# Patient Record
Sex: FEMALE | Race: BLACK OR AFRICAN AMERICAN
[De-identification: names, ages, dates, MRNs, and addresses within clinical notes are randomized per-mention and may not be internally consistent; named-entity substitution may affect disease eponyms.]

---

## 2018-04-01 ENCOUNTER — HOSPITAL ENCOUNTER (OUTPATIENT)
Dept: HOSPITAL 76 - EMS | Age: 33
Discharge: ANOTHER HOSPITAL | End: 2018-04-01
Attending: SURGERY
Payer: MEDICAID

## 2018-04-01 ENCOUNTER — HOSPITAL ENCOUNTER (INPATIENT)
Dept: HOSPITAL 76 - ED | Age: 33
LOS: 2 days | Discharge: HOME | End: 2018-04-03
Attending: OBSTETRICS & GYNECOLOGY | Admitting: OBSTETRICS & GYNECOLOGY
Payer: MEDICAID

## 2018-04-01 DIAGNOSIS — D50.0: ICD-10-CM

## 2018-04-01 DIAGNOSIS — R10.9: ICD-10-CM

## 2018-04-01 DIAGNOSIS — R42: ICD-10-CM

## 2018-04-01 LAB
ALBUMIN DIAFP-MCNC: 2.8 G/DL (ref 3.2–5.5)
ALBUMIN/GLOB SERPL: 0.8 {RATIO} (ref 1–2.2)
ALP SERPL-CCNC: 170 IU/L (ref 42–121)
ALT SERPL W P-5'-P-CCNC: 12 IU/L (ref 10–60)
ANION GAP SERPL CALCULATED.4IONS-SCNC: 9 MMOL/L (ref 6–13)
AST SERPL W P-5'-P-CCNC: 25 IU/L (ref 10–42)
BASOPHILS # BLD MANUAL: 0 10^3/UL (ref 0–0.1)
BASOPHILS # BLD MANUAL: 0 10^3/UL (ref 0–0.1)
BASOPHILS NFR BLD AUTO: 0.1 %
BASOPHILS NFR BLD AUTO: 0.1 %
BILIRUB BLD-MCNC: 0.6 MG/DL (ref 0.2–1)
BUN SERPL-MCNC: 8 MG/DL (ref 6–20)
CALCIUM UR-MCNC: 8.8 MG/DL (ref 8.5–10.3)
CHLORIDE SERPL-SCNC: 103 MMOL/L (ref 101–111)
CO2 SERPL-SCNC: 21 MMOL/L (ref 21–32)
CREAT SERPLBLD-SCNC: 0.7 MG/DL (ref 0.4–1)
EOSINOPHIL # BLD MANUAL: 0 10^3/UL (ref 0–0.7)
EOSINOPHIL # BLD MANUAL: 0 10^3/UL (ref 0–0.7)
EOSINOPHIL NFR BLD AUTO: 0 %
EOSINOPHIL NFR BLD AUTO: 0.1 %
ERYTHROCYTE [DISTWIDTH] IN BLOOD BY AUTOMATED COUNT: 13 % (ref 12–15)
ERYTHROCYTE [DISTWIDTH] IN BLOOD BY AUTOMATED COUNT: 13.1 % (ref 12–15)
FIBRINOGEN PPP-MCNC: 524 MG/DL (ref 220–496)
GFRSERPLBLD MDRD-ARVRAT: 116 ML/MIN/{1.73_M2} (ref 89–?)
GLOBULIN SER-MCNC: 3.4 G/DL (ref 2.1–4.2)
GLUCOSE SERPL-MCNC: 158 MG/DL (ref 70–100)
HGB UR QL STRIP: 10.3 G/DL (ref 12–16)
HGB UR QL STRIP: 7.1 G/DL (ref 12–16)
INR PPP: 1 (ref 0.8–1.2)
LIPASE SERPL-CCNC: 25 U/L (ref 22–51)
LYMPH ABN NFR BLD MANUAL: 0 %
LYMPH ABN NFR BLD MANUAL: 0 %
LYMPHOBLASTS # BLD: 10 %
LYMPHOBLASTS # BLD: 5 %
LYMPHOCYTES # BLD MANUAL: 1.3 10^3/UL (ref 1.5–3.5)
LYMPHOCYTES # BLD MANUAL: 2 10^3/UL (ref 1.5–3.5)
LYMPHOCYTES NFR BLD AUTO: 10 %
LYMPHOCYTES NFR BLD AUTO: 5.5 %
MANUAL DIF COMMENT BLD-IMP: (no result)
MCH RBC QN AUTO: 29.2 PG (ref 27–31)
MCH RBC QN AUTO: 29.5 PG (ref 27–31)
MCHC RBC AUTO-ENTMCNC: 33.5 G/DL (ref 32–36)
MCHC RBC AUTO-ENTMCNC: 33.7 G/DL (ref 32–36)
MCV RBC AUTO: 87 FL (ref 81–99)
MCV RBC AUTO: 87.5 FL (ref 81–99)
MONOCYTES # BLD MANUAL: 1 10^3/UL (ref 0–1)
MONOCYTES # BLD MANUAL: 1.8 10^3/UL (ref 0–1)
MONOCYTES NFR BLD AUTO: 4.4 %
MONOCYTES NFR BLD AUTO: 7.3 %
NEUTROPHILS # SNV AUTO: 20.4 X10^3/UL (ref 4.8–10.8)
NEUTROPHILS # SNV AUTO: 25.5 X10^3/UL (ref 4.8–10.8)
NEUTROPHILS NFR BLD AUTO: 82.5 %
NEUTROPHILS NFR BLD AUTO: 90 %
NEUTROPHILS NFR BLD MANUAL: 17.3 10^3/UL (ref 1.5–6.6)
NEUTROPHILS NFR BLD MANUAL: 22.4 10^3/UL (ref 1.5–6.6)
NEUTS BAND NFR BLD MANUAL: 83 %
NEUTS BAND NFR BLD MANUAL: 88 %
NEUTS BAND NFR BLD: 0 %
NEUTS BAND NFR BLD: 2 %
PDW BLD AUTO: 8.2 FL (ref 7.9–10.8)
PDW BLD AUTO: 8.4 FL (ref 7.9–10.8)
PLAT MORPH BLD: (no result)
PLAT MORPH BLD: (no result)
PLATELET # BLD: 205 10^3/UL (ref 130–450)
PLATELET # BLD: 246 10^3/UL (ref 130–450)
PLATELET BLD QL SMEAR: (no result)
PLATELET BLD QL SMEAR: (no result)
PROT SPEC-MCNC: 6.2 G/DL (ref 6.7–8.2)
PROTHROM ACT/NOR PPP: 11 SECS (ref 9.9–12.6)
RBC MAR: 2.42 10^6/UL (ref 4.2–5.4)
RBC MAR: 3.48 10^6/UL (ref 4.2–5.4)
RBC MORPH BLD: (no result)
RBC MORPH BLD: (no result)
SODIUM SERPLBLD-SCNC: 133 MMOL/L (ref 135–145)

## 2018-04-01 PROCEDURE — 83690 ASSAY OF LIPASE: CPT

## 2018-04-01 PROCEDURE — 99284 EMERGENCY DEPT VISIT MOD MDM: CPT

## 2018-04-01 PROCEDURE — 86920 COMPATIBILITY TEST SPIN: CPT

## 2018-04-01 PROCEDURE — 99285 EMERGENCY DEPT VISIT HI MDM: CPT

## 2018-04-01 PROCEDURE — 85730 THROMBOPLASTIN TIME PARTIAL: CPT

## 2018-04-01 PROCEDURE — 86850 RBC ANTIBODY SCREEN: CPT

## 2018-04-01 PROCEDURE — 36415 COLL VENOUS BLD VENIPUNCTURE: CPT

## 2018-04-01 PROCEDURE — 76857 US EXAM PELVIC LIMITED: CPT

## 2018-04-01 PROCEDURE — 85610 PROTHROMBIN TIME: CPT

## 2018-04-01 PROCEDURE — 80053 COMPREHEN METABOLIC PANEL: CPT

## 2018-04-01 PROCEDURE — 86901 BLOOD TYPING SEROLOGIC RH(D): CPT

## 2018-04-01 PROCEDURE — 85025 COMPLETE CBC W/AUTO DIFF WBC: CPT

## 2018-04-01 PROCEDURE — 86900 BLOOD TYPING SEROLOGIC ABO: CPT

## 2018-04-01 PROCEDURE — 85384 FIBRINOGEN ACTIVITY: CPT

## 2018-04-01 RX ADMIN — HYDROCODONE BITARTRATE AND ACETAMINOPHEN PRN TAB: 5; 325 TABLET ORAL at 17:48

## 2018-04-01 RX ADMIN — IBUPROFEN PRN MG: 600 TABLET, FILM COATED ORAL at 17:48

## 2018-04-01 RX ADMIN — OXYCODONE AND ACETAMINOPHEN PRN TAB: 5; 325 TABLET ORAL at 20:15
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
## 2018-04-01 NOTE — ED PHYSICIAN DOCUMENTATION
History of Present Illness





- Stated complaint


Stated Complaint: POST BIRTH COMPLICATION





- History obtained from


History obtained from: Patient





- History of Present Illness


Timing: Other (32-year-old otherwise healthy  who just gave birth today 

and had a lot of blood loss postpartum with some hypotension in route.  She 

received 10 Units of Pitocin IM.)





Review of Systems


Ten Systems: 10 systems reviewed and negative


Constitutional: reports: Reviewed and negative


Nose: reports: Reviewed and negative


Cardiac: reports: Reviewed and negative


Respiratory: reports: Reviewed and negative





PD PAST MEDICAL HISTORY





- Past Medical History


Past Medical History: No





- Past Surgical History


Past Surgical History: No





- Present Medications


Home Medications: 


 Ambulatory Orders











 Medication  Instructions  Recorded  Confirmed


 


No Known Home Medications [No  18





Known Home Medications]   














- Allergies


Allergies/Adverse Reactions: 


 Allergies











Allergy/AdvReac Type Severity Reaction Status Date / Time


 


No Known Drug Allergies Allergy   Verified 18 14:42














- Social History


Does the pt smoke?: No


Does the pt drink ETOH?: No


Does the pt have substance abuse?: No





- Family History


Family history: reports: Non contributory





PD ED PE NORMAL





- Vitals


Vital signs reviewed: Yes (tachycardic, not hypotensive)





- General


General: Alert and oriented X 3, No acute distress





- HEENT


HEENT: PERRL, EOMI





- Neck


Neck: Supple, no meningeal sign, No bony TTP





- Cardiac


Cardiac: RRR, No murmur, Strong equal pulses





- Respiratory


Respiratory: No respiratory distress, Clear bilaterally





- Abdomen


Abdomen: Non tender, Other (firm fundus)





- Back


Back: No CVA TTP, No spinal TTP





- Extremities


Extremities: No edema, No calf tenderness / cord





- Neuro


Neuro: Alert and oriented X 3, Normal speech





- Psych


Psych: Normal mood, Normal affect





Results





- Vitals


Vitals: 


 Vital Signs - 24 hr











  18





  14:38


 


Heart Rate 112 H


 


Respiratory 20





Rate 


 


Blood Pressure 121/86 H


 


O2 Saturation 100








 Oxygen











O2 Source                      Room air

















- Labs


Labs: 


 Laboratory Tests











  18





  14:35


 


WBC  25.5 H


 


RBC  3.48 L


 


Hgb  10.3 L


 


Hct  30.4 L


 


MCV  87.5


 


MCH  29.5


 


MCHC  33.7


 


RDW  13.0


 


Plt Count  246


 


MPV  8.4














PD MEDICAL DECISION MAKING





- ED course


ED course: 





32-year-old woman with postpartum hemorrhage.  Labs were checked and Pitocin 

was started.  Do not think she needs uncrossed matched blood urgently she seems 

hemodynamically okay.  Dr. Marquez, OB was at bedside on arrival.


He will admit her for further evaluation and treatment, her initial H&H is 

reassuring but will need to be trended.





Departure





- Departure


Disposition: 66 [facility] DC/Xfer


Clinical Impression: 


Postpartum hemorrhage


Qualifiers:


 Postpartum hemorrhage type: secondary postpartum hemorrhage Qualified Code(s): 

O72.2 - Delayed and secondary postpartum hemorrhage





Condition: Stable

## 2018-04-01 NOTE — ULTRASOUND REPORT
EXAM:

PELVIS ULTRASOUND, LIMITED

 

EXAM DATE: 4/1/2018 04:05 PM.

 

CLINICAL HISTORY: Post partum hemorrhage.

 

COMPARISON: None.

 

TECHNIQUE: Real-time scanning was performed with static images obtained.

 

FINDINGS: The uterus measures 21.6 x 10.5 x 9.5 cm. Volume is 1356 cc.

 

The endometrium is heterogeneous and thickened up to 3.2 cm. No evidence of vascularity. There is het
erogeneous thickening within the cervix.

 

No imaging of the adnexa was performed.

 

IMPRESSION: 

1. Enlarged postpartum uterus. 

2. There is heterogeneous thickening of the endometrium and endocervical regions. No focal vascularit
y is seen. Findings may represent blood clot and/or devascularized tissue. No sonographic evidence of
 vascular retained products of conception.

 

[facility]

Referring Provider Line: [phone]

 

SITE ID: [id number]

## 2018-04-02 LAB
BASOPHILS NFR BLD AUTO: 0.1 10^3/UL (ref 0–0.1)
BASOPHILS NFR BLD AUTO: 0.1 10^3/UL (ref 0–0.1)
BASOPHILS NFR BLD AUTO: 0.4 %
BASOPHILS NFR BLD AUTO: 0.5 %
EOSINOPHIL # BLD AUTO: 0.1 10^3/UL (ref 0–0.7)
EOSINOPHIL # BLD AUTO: 0.2 10^3/UL (ref 0–0.7)
EOSINOPHIL NFR BLD AUTO: 0.7 %
EOSINOPHIL NFR BLD AUTO: 1.5 %
ERYTHROCYTE [DISTWIDTH] IN BLOOD BY AUTOMATED COUNT: 13.1 % (ref 12–15)
ERYTHROCYTE [DISTWIDTH] IN BLOOD BY AUTOMATED COUNT: 13.6 % (ref 12–15)
HGB UR QL STRIP: 6.5 G/DL (ref 12–16)
HGB UR QL STRIP: 8.7 G/DL (ref 12–16)
LYMPHOCYTES # SPEC AUTO: 2.9 10^3/UL (ref 1.5–3.5)
LYMPHOCYTES # SPEC AUTO: 3.3 10^3/UL (ref 1.5–3.5)
LYMPHOCYTES NFR BLD AUTO: 19 %
LYMPHOCYTES NFR BLD AUTO: 25.7 %
MCH RBC QN AUTO: 29.2 PG (ref 27–31)
MCH RBC QN AUTO: 29.7 PG (ref 27–31)
MCHC RBC AUTO-ENTMCNC: 33.5 G/DL (ref 32–36)
MCHC RBC AUTO-ENTMCNC: 33.7 G/DL (ref 32–36)
MCV RBC AUTO: 87.2 FL (ref 81–99)
MCV RBC AUTO: 88.1 FL (ref 81–99)
MONOCYTES # BLD AUTO: 0.8 10^3/UL (ref 0–1)
MONOCYTES # BLD AUTO: 0.8 10^3/UL (ref 0–1)
MONOCYTES NFR BLD AUTO: 5.4 %
MONOCYTES NFR BLD AUTO: 6 %
NEUTROPHILS # BLD AUTO: 11.3 10^3/UL (ref 1.5–6.6)
NEUTROPHILS # BLD AUTO: 8.6 10^3/UL (ref 1.5–6.6)
NEUTROPHILS # SNV AUTO: 13 X10^3/UL (ref 4.8–10.8)
NEUTROPHILS # SNV AUTO: 15.2 X10^3/UL (ref 4.8–10.8)
NEUTROPHILS NFR BLD AUTO: 66.3 %
NEUTROPHILS NFR BLD AUTO: 74.5 %
PDW BLD AUTO: 7.9 FL (ref 7.9–10.8)
PDW BLD AUTO: 8 FL (ref 7.9–10.8)
PLATELET # BLD: 193 10^3/UL (ref 130–450)
PLATELET # BLD: 195 10^3/UL (ref 130–450)
RBC MAR: 2.21 10^6/UL (ref 4.2–5.4)
RBC MAR: 2.92 10^6/UL (ref 4.2–5.4)

## 2018-04-02 PROCEDURE — 30233N1 TRANSFUSION OF NONAUTOLOGOUS RED BLOOD CELLS INTO PERIPHERAL VEIN, PERCUTANEOUS APPROACH: ICD-10-PCS | Performed by: OBSTETRICS & GYNECOLOGY

## 2018-04-02 RX ADMIN — POLYETHYLENE GLYCOL 3350 SCH GM: 17 POWDER, FOR SOLUTION ORAL at 10:25

## 2018-04-02 RX ADMIN — SODIUM CHLORIDE, PRESERVATIVE FREE SCH: 5 INJECTION INTRAVENOUS at 10:31

## 2018-04-02 RX ADMIN — OXYCODONE AND ACETAMINOPHEN PRN TAB: 5; 325 TABLET ORAL at 21:28

## 2018-04-02 RX ADMIN — SODIUM CHLORIDE, PRESERVATIVE FREE SCH: 5 INJECTION INTRAVENOUS at 10:30

## 2018-04-02 RX ADMIN — IBUPROFEN PRN MG: 600 TABLET, FILM COATED ORAL at 00:50

## 2018-04-02 RX ADMIN — PRAMOXINE HYDROCHLORIDE AND HYDROCORTISONE ACETATE PRN SPRAY: 100; 100 AEROSOL, FOAM TOPICAL at 15:50

## 2018-04-02 RX ADMIN — SODIUM CHLORIDE, PRESERVATIVE FREE SCH ML: 5 INJECTION INTRAVENOUS at 15:50

## 2018-04-02 RX ADMIN — SODIUM CHLORIDE, PRESERVATIVE FREE SCH ML: 5 INJECTION INTRAVENOUS at 10:32

## 2018-04-02 RX ADMIN — HYDROCODONE BITARTRATE AND ACETAMINOPHEN PRN TAB: 5; 325 TABLET ORAL at 00:50

## 2018-04-02 RX ADMIN — IBUPROFEN PRN MG: 600 TABLET, FILM COATED ORAL at 15:49

## 2018-04-02 RX ADMIN — OXYCODONE AND ACETAMINOPHEN PRN TAB: 5; 325 TABLET ORAL at 10:23

## 2018-04-02 RX ADMIN — OXYCODONE AND ACETAMINOPHEN PRN TAB: 5; 325 TABLET ORAL at 15:49

## 2018-04-02 NOTE — PROVIDER PROGRESS NOTE
Subjective





- Prog Note Date


Prog Note Date: 04/02/18


Prog Note Time: 09:45





- Subjective


Pt reports feeling: Worse (Pt C/O fatigue.  Hgb is 6.4 gms.  at thitime she has 

changed her mind and wants Transfusion.  Reviewed Transfusion Rxn, blood borne 

pathogens.  She accepts risks and desires bolld Anemia, will transfuse 2 units 

PRBC.)





Objective





- Vital Signs/Intake & Output


Vital Signs: 


 Vital Signs x48h











  Temp Pulse Pulse Resp BP BP BP


 


 04/02/18 13:02  36.8 C   78  15    114/68


 


 04/02/18 11:31  36.7 C   82  16   114/70 


 


 04/02/18 10:15  36.7 C  93   18  102/60  


 


 04/02/18 10:05  36.4 C L  89   20  102/51 L  


 


 04/02/18 09:56  36.6 C  82   16  108/63  


 


 04/02/18 08:00  36.8 C   79  16   111/67 














  Pulse Ox


 


 04/02/18 13:02  99


 


 04/02/18 11:31  99


 


 04/02/18 10:15 


 


 04/02/18 10:05 


 


 04/02/18 09:56 


 


 04/02/18 08:00  99











Intake & Output: 


 Intake & Output











 03/30/18 03/31/18 04/01/18 04/02/18





 23:59 23:59 23:59 23:59


 


Intake Total   1250 400


 


Output Total   260 450


 


Balance   990 -50














- Lab Results


Fish Bones: 


 04/02/18 05:10





 04/01/18 14:35


Other Labs: 


 Lab Results x24hrs











  04/02/18 04/01/18 Range/Units





  05:10 19:50 


 


WBC  15.2 H  20.4 H  (4.8-10.8)  x10^3/uL


 


RBC  2.21 L  2.42 L  (4.20-5.40)  10^6/uL


 


Hgb  6.5 L*  7.1 L  (12.0-16.0)  g/dL


 


Hct  19.3 L*  21.1 L  (37.0-47.0)  %


 


MCV  87.2  87.0  (81.0-99.0)  fL


 


MCH  29.2  29.2  (27.0-31.0)  pg


 


MCHC  33.5  33.5  (32.0-36.0)  g/dL


 


RDW  13.1  13.1  (12.0-15.0)  %


 


Plt Count  193  205  (130-450)  10^3/uL


 


MPV  7.9  8.2  (7.9-10.8)  fL


 


Neut #  11.3 H  Not Reportable  


 


Lymph #  2.9  Not Reportable  


 


Mono #  0.8  Not Reportable  


 


Eos #  0.1  Not Reportable  


 


Baso #  0.1  Not Reportable  


 


Absolute Nucleated RBC  0.01  Not Reportable  


 


Total Counted   100  


 


Band Neuts % (Manual)   2 (0 - 10) %


 


Abnorm Lymph % (Manual)   0  %


 


Nucleated RBC %  0.0  Not Reportable  


 


Neutrophils # (Manual)   17.3 H  (1.5-6.6)  10^3/uL


 


Lymphocytes # (Manual)   2.0  (1.5-3.5)  10^3/uL


 


Monocytes # (Manual)   1.0  (0.0-1.0)  10^3/uL


 


Eosinophils # (Manual)   0.0  (0-0.7)  10^3/uL


 


Basophils # (Manual)   0.0  (0-0.1)  10^3/uL


 


Manual Slide Review   Indicated  


 


Platelet Estimate   NORMAL (130-450,000)  (NORMAL)  


 


Platelet Morphology   NORMAL APPEARANCE  (NORMAL)  


 


RBC Morph Micro Appear   1+ HYPOCHROMASIA  (NORMAL)

## 2018-04-02 NOTE — PROVIDER PROGRESS NOTE
Subjective





- Prog Note Date


Prog Note Date: 04/02/18


Prog Note Time: 06:30





- Subjective


Pt reports feeling: No change


Subjective: 


Mrs. Clay feels well but very weak.  She has gotten up to void and walk for 

short distances around the room.  She is breast-feeding without difficulty.  

She reports no syncopal episodes, chest pain, shortness of breath, orthostatic 

dizziness or continued uterine bleeding.  Baby is doing well.  She continues to 

note uterine contractions but much less intensity than yesterday during the 

Pitocin infusion started on admission.  We discussed her morning hemoglobin of 

6.5 and possible problems associated with it such as syncope poor exercise 

tolerance extreme fatigue etc.  The benefits of transfusion were explained 

mostly faster recovery from delivery.  Also, we discussed some of the drawbacks 

such as transfusion reaction undetected hepatitis or HIV.  At this point, the 

patient would like to avoid transfusion and determine if she has enough 

strength and endurance as she resumes more normal activity.








Objective





- Vital Signs/Intake & Output


Vital Signs: 


 Vital Signs x48h











  Temp Pulse Resp BP Pulse Ox


 


 04/02/18 06:58     117/70  99


 


 04/02/18 05:00  98.2 F  87  16  109/62  98


 


 04/01/18 23:35  98.6 F  83  16  111/65 











Intake & Output: 


 Intake & Output











 03/30/18 03/31/18 04/01/18 04/02/18





 23:59 23:59 23:59 23:59


 


Intake Total   1250 


 


Output Total   260 


 


Balance   990 














- Lab Results


Fish Bones: 


 04/02/18 05:10





 04/01/18 14:35


Other Labs: 


 Lab Results x24hrs











  04/02/18 04/01/18 Range/Units





  05:10 19:50 


 


WBC  15.2 H  20.4 H  (4.8-10.8)  x10^3/uL


 


RBC  2.21 L  2.42 L  (4.20-5.40)  10^6/uL


 


Hgb  6.5 L*  7.1 L  (12.0-16.0)  g/dL


 


Hct  19.3 L*  21.1 L  (37.0-47.0)  %


 


MCV  87.2  87.0  (81.0-99.0)  fL


 


MCH  29.2  29.2  (27.0-31.0)  pg


 


MCHC  33.5  33.5  (32.0-36.0)  g/dL


 


RDW  13.1  13.1  (12.0-15.0)  %


 


Plt Count  193  205  (130-450)  10^3/uL


 


MPV  7.9  8.2  (7.9-10.8)  fL


 


Neut #  11.3 H  Not Reportable  


 


Lymph #  2.9  Not Reportable  


 


Mono #  0.8  Not Reportable  


 


Eos #  0.1  Not Reportable  


 


Baso #  0.1  Not Reportable  


 


Absolute Nucleated RBC  0.01  Not Reportable  


 


Total Counted   100  


 


Band Neuts % (Manual)   2 (0 - 10) %


 


Abnorm Lymph % (Manual)   0  %


 


Nucleated RBC %  0.0  Not Reportable  


 


Neutrophils # (Manual)   17.3 H  (1.5-6.6)  10^3/uL


 


Lymphocytes # (Manual)   2.0  (1.5-3.5)  10^3/uL


 


Monocytes # (Manual)   1.0  (0.0-1.0)  10^3/uL


 


Eosinophils # (Manual)   0.0  (0-0.7)  10^3/uL


 


Basophils # (Manual)   0.0  (0-0.1)  10^3/uL


 


Manual Slide Review   Indicated  


 


Platelet Estimate   NORMAL (130-450,000)  (NORMAL)  


 


Platelet Morphology   NORMAL APPEARANCE  (NORMAL)  


 


RBC Morph Micro Appear   1+ HYPOCHROMASIA  (NORMAL)  














Physical Exam





- Physical Exam


General: positive: No acute distress, Other (Fairly alert cooperative and 

oriented)


HEENT: positive: Moist mucous membranes, Dentition normal


Neck: positive: Supple w/out meningeal sx


Cardiac: positive: Regular Rate, Murmur Present (Flow murmur but no rubs clicks 

or diastolic component)


Resipratory: positive: Clear to ausultation sanjay


Abdomen: positive: Normal Bowel sounds, Other (Nontender nondistended)


Female : positive: Normal external (Pad check shows little new vaginal 

bleeding, no foul smell), Enlarged uterus (16 weeks size, firm nontender), 

Chaperone present


Extremities: positive: Non tender


Skin: positive: Warm and dry, Pale


Neurologic: positive: Alert and Oriented X 3, Normal motor/no weakness, Normal 

Sensation, Normal Speech





Assessment/Plan





- Assessment/Plan


Assessment: 





Stella Clay had a postpartum hemorrhage in which 1600 cc of blood was 

reported as lost and is now equilibrated to a hemoglobin of 6.5.  She is 

tolerating her anemia well but has not been challenged by trying to resume her 

normal activity.  She prefers to avoid transfusion if possible.  2 units of 

packed red blood cells are still on hold.  Infant seems to be doing well.


Plan: 





PLAN


* Patient instructed to resume normal activity including walking the nelson 

toileting breast-feeding etc.


* If patient is unable to resume normal activities will revisit the transfusion 

decision.  


* Begin supplemental iron oral and may begin IV iron supplementation. 


* Anticipate at least 1 more day of observation to ensure she is recovered.

## 2018-04-03 VITALS — DIASTOLIC BLOOD PRESSURE: 73 MMHG | SYSTOLIC BLOOD PRESSURE: 112 MMHG

## 2018-04-03 RX ADMIN — PRAMOXINE HYDROCHLORIDE AND HYDROCORTISONE ACETATE PRN SPRAY: 100; 100 AEROSOL, FOAM TOPICAL at 09:18

## 2018-04-03 RX ADMIN — IBUPROFEN PRN MG: 600 TABLET, FILM COATED ORAL at 09:20

## 2018-04-03 RX ADMIN — POLYETHYLENE GLYCOL 3350 SCH GM: 17 POWDER, FOR SOLUTION ORAL at 09:19

## 2018-04-03 RX ADMIN — OXYCODONE AND ACETAMINOPHEN PRN TAB: 5; 325 TABLET ORAL at 09:20

## 2018-04-03 NOTE — DISCHARGE SUMMARY
Physician: Denzel Marquez MD

DATE OF ADMISSION: 04/01/2018

DATE OF DISCHARGE:  04/03/2018

 

DIAGNOSES

1.  Postpartum hemorrhage reported at 1600 mL.

2.  Recent birth of macrosomic infant at [facility].

3.  Symptomatic anemia requiring transfusion of 2 units of packed red blood 
cells.



COMPLICATIONS NONE

 

HISTORY:  The patient is a 32-year-old,  woman who had an 
uneventful birth at 

[facility] of a 9 pound, 8 ounce infant.  Immediately postpartum, she had 
some bleeding 

that stopped to return within a half an hour of heavy bleeding.  The patient 
was tachycardic 

and had unstable blood pressure.  At the [facility], she was given Pitocin 
10 

mg and transported to the hospital via [facility].  Reference my typewritten H 
and P.

 

HOSPITAL COURSE:  Examination in the emergency room found no lacerations of the 
vulva, vagina, 

or cervix.  IV Pitocin was begun, as well as second line IV access with normal 
saline.  

Ultrasound documented no active bleeding or contents within the uterus.  
Patient was 

tachycardic, but her pressures stabilized.  Baseline Hemoglobin value from 
[facility] was 

10.8 and hemoglobin at ER presentation was 10.3 g.    After 6 hours hemoglobin 
equilibrated to 7.1 inpatient remained hemodynamically stable.  However by 
morning it had dropped to 6.5 g..

 

The patient was stabilized, and admitted to Labor and Delivery for further 
observation.  She 

was given supportive care and noted fatigue, but no syncope or other immediate 
symptoms.  She 

remained at a low activity level.  Repeat hemoglobin in the a.m. was 6.5.  
After initial 

counseling, she declined a transfusion.  However, once she became more active, 
her fatigue 

worsened and she elected to be transfused.  Two units of packed red blood cells 
were given, and

patient's energy level and feeling of wellbeing immediately improved.  She 
remained overnight 

with supportive care and felt ready for discharge in the morning.

 

She was given warning signs and callback instructions.  She will report any 
fevers, chills, 

abdominal pain, return of heavy bleeding or foul discharge.  She will be seen 
in the [facility] in 2 weeks for repeat hemoglobin and hematocrit.  She will make 
arrangements for normal 

postpartum care with [facility].

 

DISCHARGE MEDICATIONS

1.  Prenatal vitamins with iron.

2.  Ferrous sulfate 325 b.i.d.

3.  Colace 250 b.i.d.

 

 

DD: 04/03/2018 08:36

TD: 04/03/2018 09:18

Job #: [id number]

[email or web address]

## 2018-04-03 NOTE — DISCHARGE PLAN
Discharge Plan


Disposition: 01 Home, Self Care


Condition: Good


Diet: Regular (Please provide instructions for a high iron diet inclusive of 

red meat spinach kale etc.  Enforce the need to continue prenatal vitamins and 

supplemental iron.)


Activity Restrictions: Activity as Tolerated


Shower Restrictions: No


Driving Restrictions: No


No Smoking: If you smoke, Please STOP!  Call [phone] for help.


Follow-up with: 


Denzel Marquez MD [Provider Admit Priv/Credential] -

## 2020-12-14 ENCOUNTER — HOSPITAL ENCOUNTER (OUTPATIENT)
Dept: HOSPITAL 76 - LAB.WCP | Age: 35
Discharge: HOME | End: 2020-12-14
Attending: FAMILY MEDICINE
Payer: MEDICAID

## 2020-12-14 DIAGNOSIS — Z83.79: ICD-10-CM

## 2020-12-14 DIAGNOSIS — R53.83: Primary | ICD-10-CM

## 2020-12-14 LAB
ALBUMIN DIAFP-MCNC: 4 G/DL (ref 3.2–5.5)
ALBUMIN/GLOB SERPL: 1.4 {RATIO} (ref 1–2.2)
ALP SERPL-CCNC: 75 IU/L (ref 42–121)
ALT SERPL W P-5'-P-CCNC: 16 IU/L (ref 10–60)
ANION GAP SERPL CALCULATED.4IONS-SCNC: 14 MMOL/L (ref 6–13)
AST SERPL W P-5'-P-CCNC: 16 IU/L (ref 10–42)
BASOPHILS NFR BLD AUTO: 0 10^3/UL (ref 0–0.1)
BASOPHILS NFR BLD AUTO: 0.7 %
BILIRUB BLD-MCNC: 0.4 MG/DL (ref 0.2–1)
BUN SERPL-MCNC: 18 MG/DL (ref 6–20)
CALCIUM UR-MCNC: 9.3 MG/DL (ref 8.5–10.3)
CHLORIDE SERPL-SCNC: 102 MMOL/L (ref 101–111)
CO2 SERPL-SCNC: 28 MMOL/L (ref 21–32)
CREAT SERPLBLD-SCNC: 1.5 MG/DL (ref 0.4–1)
CRP SERPL-MCNC: < 1 MG/DL (ref 0–1)
EOSINOPHIL # BLD AUTO: 0.3 10^3/UL (ref 0–0.7)
EOSINOPHIL NFR BLD AUTO: 5.2 %
ERYTHROCYTE [DISTWIDTH] IN BLOOD BY AUTOMATED COUNT: 12.2 % (ref 12–15)
GLOBULIN SER-MCNC: 2.9 G/DL (ref 2.1–4.2)
GLUCOSE SERPL-MCNC: 104 MG/DL (ref 70–100)
HGB UR QL STRIP: 11.4 G/DL (ref 12–16)
LYMPHOCYTES # SPEC AUTO: 1.9 10^3/UL (ref 1.5–3.5)
LYMPHOCYTES NFR BLD AUTO: 30.7 %
MCH RBC QN AUTO: 28.7 PG (ref 27–31)
MCHC RBC AUTO-ENTMCNC: 31.5 G/DL (ref 32–36)
MCV RBC AUTO: 91.2 FL (ref 81–99)
MONOCYTES # BLD AUTO: 0.4 10^3/UL (ref 0–1)
MONOCYTES NFR BLD AUTO: 6.1 %
NEUTROPHILS # BLD AUTO: 3.5 10^3/UL (ref 1.5–6.6)
NEUTROPHILS # SNV AUTO: 6.1 X10^3/UL (ref 4.8–10.8)
NEUTROPHILS NFR BLD AUTO: 57 %
PDW BLD AUTO: 11.1 FL (ref 7.9–10.8)
PLATELET # BLD: 263 10^3/UL (ref 130–450)
PROT SPEC-MCNC: 6.9 G/DL (ref 6.7–8.2)
RBC MAR: 3.97 10^6/UL (ref 4.2–5.4)
SODIUM SERPLBLD-SCNC: 144 MMOL/L (ref 135–145)

## 2020-12-14 PROCEDURE — 36415 COLL VENOUS BLD VENIPUNCTURE: CPT

## 2020-12-14 PROCEDURE — 86038 ANTINUCLEAR ANTIBODIES: CPT

## 2020-12-14 PROCEDURE — 85025 COMPLETE CBC W/AUTO DIFF WBC: CPT

## 2020-12-14 PROCEDURE — 80053 COMPREHEN METABOLIC PANEL: CPT

## 2020-12-14 PROCEDURE — 84443 ASSAY THYROID STIM HORMONE: CPT

## 2020-12-14 PROCEDURE — 86140 C-REACTIVE PROTEIN: CPT

## 2020-12-14 PROCEDURE — 85651 RBC SED RATE NONAUTOMATED: CPT

## 2021-10-15 ENCOUNTER — HOSPITAL ENCOUNTER (EMERGENCY)
Dept: HOSPITAL 76 - ED | Age: 36
Discharge: HOME | End: 2021-10-15
Payer: MEDICAID

## 2021-10-15 VITALS — DIASTOLIC BLOOD PRESSURE: 87 MMHG | SYSTOLIC BLOOD PRESSURE: 148 MMHG

## 2021-10-15 DIAGNOSIS — S89.91XA: Primary | ICD-10-CM

## 2021-10-15 DIAGNOSIS — M25.461: ICD-10-CM
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
## 2021-10-15 PROCEDURE — 99283 EMERGENCY DEPT VISIT LOW MDM: CPT

## 2021-10-15 PROCEDURE — 73564 X-RAY EXAM KNEE 4 OR MORE: CPT

## 2021-10-15 PROCEDURE — 99284 EMERGENCY DEPT VISIT MOD MDM: CPT

## 2021-10-15 NOTE — ED PHYSICIAN DOCUMENTATION
PD HPI LOWER EXT INJURY





- Stated complaint


Stated Complaint: RIGHT KNEE INJURY





- Chief complaint


Chief Complaint: Trauma Ext





- History obtained from


History obtained from: Patient





- History of Present Illness


PD HPI LOW EXT INJURY LOCATION: Knee


Type of injury: Fall


Where injury occurred: Other


Worsened by: Moving, Palpating





- Additional information


Additional information: 


Pt 35 yo F with Rt Knee pain. Reports fall yesterday while at [employer]. 

Denies Head trauma, LOC or blood thinners. Stats cannot place weight on knee or 

ambulate without assistance. Denies previous issues with same knee. 





Review of Systems


Ten Systems: 10 systems reviewed and negative


Constitutional: denies: Fever


Cardiac: denies: Chest pain / pressure


Respiratory: denies: Dyspnea


GI: denies: Abdominal Pain


: denies: Dysuria


Musculoskeletal: denies: Neck pain


Neurologic: denies: Generalized weakness





PD PAST MEDICAL HISTORY





- Past Medical History


Past Medical History: No





- Past Surgical History


Past Surgical History: No





- Present Medications


Home Medications: 


                                Ambulatory Orders











 Medication  Instructions  Recorded  Confirmed


 


HYDROcod/ACETAM 5/325 [Norco 5/325] 1 tab PO Q6H PRN #10 tablet 10/15/21 


 


Ibuprofen [Motrin] 800 mg PO Q8H PRN #30 tablet 10/15/21 














- Allergies


Allergies/Adverse Reactions: 


                                    Allergies











Allergy/AdvReac Type Severity Reaction Status Date / Time


 


No Known Drug Allergies Allergy   Verified 10/15/21 15:18














- Social History


Does the pt smoke?: No


Smoking Status: Never smoker


Does the pt drink ETOH?: No


Does the pt have substance abuse?: No





PD ED PE NORMAL





- General


General: Alert and oriented X 3





- HEENT


HEENT: Atraumatic





- Neck


Neck: Supple, no meningeal sign





- Respiratory


Respiratory: No: No respiratory distress





- Female 


Female : Deferred





- Rectal


Rectal: Deferred





- Neuro


Neuro: Alert and oriented X 3, CNs 2-12 intact





PD ED PE EXPANDED





- Extremities


Extremities: Tenderness, Limited ROM, Swelling, Left knee.  No: Laceration, 

Joint effusion, Ligament laxity





Results





- Vitals


Vitals: 


                               Vital Signs - 24 hr











  10/15/21





  15:18


 


Temperature 36.7 C


 


Heart Rate 71


 


Respiratory 18





Rate 


 


Blood Pressure 151/79 H


 


O2 Saturation 100








                                     Oxygen











O2 Source                      Room air

















PD MEDICAL DECISION MAKING





- ED course


ED course: 


Pt 35 yo F presenting with right knee pain after fall at the [employer] 

yesterday. A febrile, hypodermically stable on arrival. No joint laxity, on 

exam. otherwise neuro-vascular intact. Given [place] in the ED for pain control. 

Xrays negative for acute fracture however did demonstrate large joint effusion 

with lipoarthrosis suggestive of occult fracture. Patient provided with knee 

immobilize/ crutches. Will discharge with medication for pain control. Will 

discharge with f/u instructions for local area orthopedics. 





Departure





- Departure


Disposition: 01 Home, Self Care


Clinical Impression: 


 Knee injury





Instructions:  ED Knee Pain [facility]


Follow-Up: 


 [facility] [Provider Group]


Prescriptions: 


Ibuprofen [Motrin] 800 mg PO Q8H PRN #30 tablet


 PRN Reason: PAIN &/OR FEVER


HYDROcod/ACETAM 5/325 [Norco 5/325] 1 tab PO Q6H PRN #10 tablet


 PRN Reason: pain


Comments: 


Thank you for allowing us to care for you today at [facility]. Your knee xray

did not show a clear or obvious fracture but it did show some signs concerning 

for occult or hidden fracture. I would like you to keep your knee immobilized 

and remain non weight bearing on your right leg until you  can follow up with 

orthopedics.

## 2021-10-15 NOTE — XRAY REPORT
PROCEDURE:  Knee 4 View RT

 

INDICATIONS:  knee injury

 

TECHNIQUE:  4 views of the right knee were acquired.  

 

COMPARISON:  None.

 

FINDINGS:  

 

Bones:  No displaced or depressed fractures. No dislocations.  There is a mild lateral tilt of the pa
tella. No suspicious bony lesions.  

 

Soft tissues: There is a moderate to large joint effusion with a lipohemarthrosis. No suspicious soft
 tissue calcifications.  

 

IMPRESSION:  

 

1. No displaced or depressed fracture identified.

 

2. Moderate to large joint effusion with a lipohemarthrosis suggestive of a nondisplaced fracture.

 

Reviewed by: Charles Ramos MD on 10/15/2021 4:05 PM PDT

Approved by: Charles Ramos MD on 10/15/2021 4:05 PM PDT

 

 

Station ID:  [id number]

## 2023-02-21 ENCOUNTER — HOSPITAL ENCOUNTER (OUTPATIENT)
Dept: HOSPITAL 76 - LAB.N | Age: 38
Discharge: HOME | End: 2023-02-21
Attending: ADVANCED PRACTICE MIDWIFE
Payer: MEDICAID

## 2023-02-21 DIAGNOSIS — Z36.89: Primary | ICD-10-CM

## 2023-02-21 LAB
BASOPHILS NFR BLD AUTO: 0 10^3/UL (ref 0–0.1)
BASOPHILS NFR BLD AUTO: 0.2 %
EOSINOPHIL # BLD AUTO: 0.2 10^3/UL (ref 0–0.7)
EOSINOPHIL NFR BLD AUTO: 1.7 %
ERYTHROCYTE [DISTWIDTH] IN BLOOD BY AUTOMATED COUNT: 11.9 % (ref 12–15)
EST. AVERAGE GLUCOSE BLD GHB EST-MCNC: 105 MG/DL (ref 70–100)
HBA1C MFR BLD HPLC: 5.3 % (ref 4.27–6.07)
HCT VFR BLD AUTO: 33 % (ref 37–47)
HGB UR QL STRIP: 10.8 G/DL (ref 12–16)
LYMPHOCYTES # SPEC AUTO: 2.3 10^3/UL (ref 1.5–3.5)
LYMPHOCYTES NFR BLD AUTO: 21.1 %
MCH RBC QN AUTO: 28.3 PG (ref 27–31)
MCHC RBC AUTO-ENTMCNC: 32.7 G/DL (ref 32–36)
MCV RBC AUTO: 86.4 FL (ref 81–99)
MONOCYTES # BLD AUTO: 0.6 10^3/UL (ref 0–1)
MONOCYTES NFR BLD AUTO: 5.7 %
NEUTROPHILS # BLD AUTO: 7.7 10^3/UL (ref 1.5–6.6)
NEUTROPHILS # SNV AUTO: 11 X10^3/UL (ref 4.8–10.8)
NEUTROPHILS NFR BLD AUTO: 70.6 %
NRBC # BLD AUTO: 0 /100WBC
NRBC # BLD AUTO: 0 X10^3/UL
PDW BLD AUTO: 10.5 FL (ref 7.9–10.8)
PLATELET # BLD: 284 10^3/UL (ref 130–450)
RBC MAR: 3.82 10^6/UL (ref 4.2–5.4)

## 2023-02-21 PROCEDURE — 87340 HEPATITIS B SURFACE AG IA: CPT

## 2023-02-21 PROCEDURE — 86900 BLOOD TYPING SEROLOGIC ABO: CPT

## 2023-02-21 PROCEDURE — 86762 RUBELLA ANTIBODY: CPT

## 2023-02-21 PROCEDURE — 86901 BLOOD TYPING SEROLOGIC RH(D): CPT

## 2023-02-21 PROCEDURE — 86592 SYPHILIS TEST NON-TREP QUAL: CPT

## 2023-02-21 PROCEDURE — 87389 HIV-1 AG W/HIV-1&-2 AB AG IA: CPT

## 2023-02-21 PROCEDURE — 85025 COMPLETE CBC W/AUTO DIFF WBC: CPT

## 2023-02-21 PROCEDURE — 86803 HEPATITIS C AB TEST: CPT

## 2023-02-21 PROCEDURE — 36415 COLL VENOUS BLD VENIPUNCTURE: CPT

## 2023-02-21 PROCEDURE — 83036 HEMOGLOBIN GLYCOSYLATED A1C: CPT

## 2023-02-21 PROCEDURE — 86787 VARICELLA-ZOSTER ANTIBODY: CPT

## 2023-02-21 PROCEDURE — 86850 RBC ANTIBODY SCREEN: CPT

## 2023-02-23 LAB — VZV IGG SER IA-ACNC: <135 INDEX

## 2023-03-29 ENCOUNTER — HOSPITAL ENCOUNTER (OUTPATIENT)
Dept: HOSPITAL 76 - DI | Age: 38
Discharge: HOME | End: 2023-03-29
Attending: ADVANCED PRACTICE MIDWIFE
Payer: MEDICAID

## 2023-03-29 DIAGNOSIS — Z36.89: ICD-10-CM

## 2023-03-29 DIAGNOSIS — Z34.02: Primary | ICD-10-CM

## 2023-03-29 NOTE — ULTRASOUND REPORT
PROCEDURE:  OB Detailed Fetal Eval

 

INDICATIONS:  SUPERVISION OF PREGNANCY

 

OUTSIDE/PRIOR DATING DATA:  

Last menstrual period (LMP): 11/6/2022.  

LMP-based estimated date of delivery (RY): 8/13/2023.  

First dating scan (date and location): 2/13/2023.  

Estimated date of delivery (RY) from first dating scan: 8/18/2023.  

The below data below was generated using the clinical RY of 8/13/2023

 

TECHNIQUE: 

Real-time scanning was performed of the fetus, with image documentation and biometric measurements.  


Endovaginal scanning:  None

 

COMPARISON:  None.

 

FINDINGS:

 

General:  A single living intrauterine gestation is present.  

Presentation:  Breech

Placenta:  Placental position is fundal, without previa.  

Amniotic fluid index:  14.3 cm,  normal for gestational age.  

Fetal heart rate:  136 beats per minute.  

Maternal cervical canal:  3.1 cm long; normal length is 2.5 cm or more.  

 

Fetal biometrics:  

Biparietal diameter:  4.3 cm, 20 weeks 3 days

Head circumference:  18.1 cm, 20 weeks 4 days

Abdominal circumference:  16.0 cm, 21 weeks 1 day

Femur length:  3.1 cm, 19 weeks 5 days

Estimated gestational age from initial scan: 20 weeks 3 days

Composite gestational age from present scan:  20 weeks 3 days

Estimated fetal weight and percentile:  354 g, 46th percentile

Measurement variability in biometric dating: +/- 10 days from 12-20 weeks gestation, +/- 2 weeks from
 20-30 weeks gestation, +/- 3 weeks at 30 weeks gestation or later.  

 

Anatomic survey:  

Neuro:  Ventricles are normal at less than 10 mm.  Cisterna magna is normal at 3-11 mm.  Cerebellum i
s normal in size and morphology.  

Nuchal skin fold:  Normal at less than 6 mm between 14 and 20 weeks gestational age.  

Face:  Nose and lips, facial profile are normal.  

Spine:  No evidence for spina bifida.  

Heart:  4-chambered heart is present, with normal ventricular outflow tracts.  

Diaphragm:  Diaphragm is intact.  

Stomach:  Left-sided stomach is present.  

Kidneys:  No fetal hydronephrosis.  Normal is less than 5 mm in 2nd trimester, less than 7 mm in 3rd 
trimester.  

Cord:  3 vessel cord has orthotopic insertion.  

Bladder:  Normal in size.  

Extremities:  All 4 extremities are visualized.  

 

IMPRESSION:  

Single living intrauterine pregnancy at 20 weeks 3 days, RY of 8/13/2023.

 

Normal fetal anatomy survey.

 

Reviewed by: Thiago Ricketts on 3/29/2023 3:53 PM PDT

Approved by: Thiago Ricketts on 3/29/2023 3:53 PM PDT

 

 

Station ID:  [id number]

## 2023-08-24 ENCOUNTER — HOSPITAL ENCOUNTER (OUTPATIENT)
Dept: HOSPITAL 76 - WFO | Age: 38
Discharge: HOME | End: 2023-08-24
Attending: ADVANCED PRACTICE MIDWIFE
Payer: MEDICAID

## 2023-08-24 VITALS — SYSTOLIC BLOOD PRESSURE: 119 MMHG | DIASTOLIC BLOOD PRESSURE: 77 MMHG | OXYGEN SATURATION: 100 %

## 2023-08-24 DIAGNOSIS — O48.0: Primary | ICD-10-CM

## 2023-08-24 DIAGNOSIS — Z3A.41: ICD-10-CM

## 2023-08-24 PROCEDURE — 59025 FETAL NON-STRESS TEST: CPT

## 2023-08-24 NOTE — ULTRASOUND REPORT
PROCEDURE:  OB Fetal Bio w/Non Stress

 

INDICATIONS:  post dates

 

OUTSIDE/PRIOR DATING DATA:  

Last menstrual period (LMP): 11/6/2022.  

LMP-based estimated date of delivery (RY): 8/13/2023.  

First dating scan (date and location): 2/13/2023.  

Estimated date of delivery (RY) from first dating scan: 8/18/2023.  

The below data below was generated using the ultrasound RY of 8/18/2023

 

TECHNIQUE:  Real-time scanning was performed of the fetus, with image documentation "and biometric me
asurements".  Biophysical profile was also obtained.  

Endovaginal scanning:  Not performed

 

COMPARISON:  3/29/2023

 

FINDINGS:  

 

General:  A single living intrauterine gestation is present.  

Presentation:  Vertex

Placenta:  Placental position is anterior, without previa.  

Amniotic fluid index:  12.2 cm, normal for gestational age.  

Fetal heart rate:  144 beats per minute. 

 

Biophysical profile:  

Tone:  2 points.

Movement:  2 points.  

Respiration:  2 points.  

Largest pocket of fluid:  2 points.  

 

Umbilical artery Doppler:  Normal appearance. 

 

Nuchal cord.

 

IMPRESSION:  

Single living intrauterine pregnancy at 40 weeks 6 days, RY of 8/18/2023.

 

BPP 8 of 8.

 

Normal umbilical artery waveform.

 

Nuchal cord.

 

Reviewed by: Thiago Ricketts on 8/24/2023 10:42 AM PDT

Approved by: Thiago Ricketts on 8/24/2023 10:42 AM PDT

 

 

Station ID:  [id number]

## 2023-08-26 ENCOUNTER — HOSPITAL ENCOUNTER (INPATIENT)
Dept: HOSPITAL 76 - WFO | Age: 38
LOS: 1 days | Discharge: HOME | End: 2023-08-27
Attending: ADVANCED PRACTICE MIDWIFE | Admitting: ADVANCED PRACTICE MIDWIFE
Payer: MEDICAID

## 2023-08-26 DIAGNOSIS — O48.0: Primary | ICD-10-CM

## 2023-08-26 DIAGNOSIS — Z3A.41: ICD-10-CM

## 2023-08-26 LAB
BASOPHILS NFR BLD AUTO: 0.1 10^3/UL (ref 0–0.1)
BASOPHILS NFR BLD AUTO: 0.4 %
EOSINOPHIL # BLD AUTO: 0.1 10^3/UL (ref 0–0.7)
EOSINOPHIL NFR BLD AUTO: 1.1 %
ERYTHROCYTE [DISTWIDTH] IN BLOOD BY AUTOMATED COUNT: 13.2 % (ref 12–15)
HCT VFR BLD AUTO: 36.2 % (ref 37–47)
HGB UR QL STRIP: 11.9 G/DL (ref 12–16)
LYMPHOCYTES # SPEC AUTO: 1.9 10^3/UL (ref 1.5–3.5)
LYMPHOCYTES NFR BLD AUTO: 17.3 %
MCH RBC QN AUTO: 27.9 PG (ref 27–31)
MCHC RBC AUTO-ENTMCNC: 32.9 G/DL (ref 32–36)
MCV RBC AUTO: 84.8 FL (ref 81–99)
MONOCYTES # BLD AUTO: 0.4 10^3/UL (ref 0–1)
MONOCYTES NFR BLD AUTO: 3.2 %
NEUTROPHILS # BLD AUTO: 8.7 10^3/UL (ref 1.5–6.6)
NEUTROPHILS # SNV AUTO: 11.2 X10^3/UL (ref 4.8–10.8)
NEUTROPHILS NFR BLD AUTO: 77.2 %
NRBC # BLD AUTO: 0 /100WBC
NRBC # BLD AUTO: 0 X10^3/UL
PDW BLD AUTO: 10 FL (ref 7.9–10.8)
PLATELET # BLD: 254 10^3/UL (ref 130–450)
RBC MAR: 4.27 10^6/UL (ref 4.2–5.4)

## 2023-08-26 PROCEDURE — 85025 COMPLETE CBC W/AUTO DIFF WBC: CPT

## 2023-08-26 PROCEDURE — 86901 BLOOD TYPING SEROLOGIC RH(D): CPT

## 2023-08-26 PROCEDURE — 86920 COMPATIBILITY TEST SPIN: CPT

## 2023-08-26 PROCEDURE — 36415 COLL VENOUS BLD VENIPUNCTURE: CPT

## 2023-08-26 PROCEDURE — 86850 RBC ANTIBODY SCREEN: CPT

## 2023-08-26 PROCEDURE — 86900 BLOOD TYPING SEROLOGIC ABO: CPT

## 2023-08-26 PROCEDURE — 10907ZC DRAINAGE OF AMNIOTIC FLUID, THERAPEUTIC FROM PRODUCTS OF CONCEPTION, VIA NATURAL OR ARTIFICIAL OPENING: ICD-10-PCS | Performed by: ADVANCED PRACTICE MIDWIFE

## 2023-08-26 RX ADMIN — IBUPROFEN SCH MG: 800 TABLET, FILM COATED ORAL at 21:42

## 2023-08-26 RX ADMIN — SODIUM CHLORIDE, PRESERVATIVE FREE SCH: 5 INJECTION INTRAVENOUS at 18:31

## 2023-08-26 RX ADMIN — SODIUM CHLORIDE, PRESERVATIVE FREE SCH: 5 INJECTION INTRAVENOUS at 15:48

## 2023-08-26 NOTE — HISTORY & PHYSICAL EXAMINATION
Prenatal Admit History





- Visit Reason


Visit Reason: Other





- Pregnancy


: 6


Parity: 5


Premature: 0


Ectopic: 0


: 0


Prenatal Care: positive: [facility]


Prenatal Risk/History: positive: Other


Pregnancy Complications This Pregnancy: positive: Other


Smoking Status: Never smoker





- Mother's Labs


Mother's Blood Type: positive: O


Mother's RH: positive: Positive


GBS: positive: Group B Step Negative


Rubella Status: positive: Immune





- HPI


Diagnosis/Indication for NST: Other





- NST Procedure





NST Procedure





Start Time                       09:06


Stop Time                        09:32











- Results and Plan


Findings/Impression: 





NST reactive.


FHR baseline 150, moderate variability, + accels, no decels


Contractions palpate mild occasionally with soft resting tone





Meds/Allgy





- Home Medications


Home Medications: 


                                Ambulatory Orders











 Medication  Instructions  Recorded  Confirmed


 


HYDROcod/ACETAM 5/325 [Norco 5/325] 1 tab PO Q6H PRN #10 tablet 10/15/21 


 


Ibuprofen [Motrin] 800 mg PO Q8H PRN #30 tablet 10/15/21 














- Allergies


Allergies/Adverse Reactions: 


                                    Allergies











Allergy/AdvReac Type Severity Reaction Status Date / Time


 


No Known Drug Allergies Allergy   Verified 10/19/21 11:25














Review of Systems





- Constitutional


Constitutional: denies: Fatigue, Fever, Chills





- Eyes


Eyes: denies: Blurred vision, Spots in vision, Dipolpia





- Cardiovascular


Cariovascular: denies: Irregular heart rate, Palpitations, Chest pain, Edema





- Respiratory


Respiratory: denies: Cough, Wheezing, SOB at rest





- Gastrointestinal


Gastrointestinal: denies: Constipation, Diarrhea, Nausea, Vomiting





- Genitourinary


Genitourinary: denies: Dysuria





- Integumentary


Integumentary: denies: Rash, Pruritis





- Neurological


Neurological: denies: Headache





- Psychiatric


Psychiatric: denies: Depression, Anxiety





- Hematologic/Lymphatic


Hematologic/Lymphatic: denies: Anemia





Physical





- Abdominal Exam


Contraction Frequency (min/apart): occasional


Contraction Intensity: positive: Mild


Uterine Resting Tone: positive: Soft





- Fetal Monitoring


Fetal Heart Rate Baseline: 150


Fetal Strip Review: positive: Category I





- Presentation


Presentation: positive: Vertex





- Vaginal Exam


Membranes: positive: Membranes intact





- Speculum Exam


Speculum Exam Performed: positive: No





Plan for Labor





- Plan For Labor


I expect patient to be DC'd or transferred within 96 hours.: Yes


Plan for Labor: 





HPI: This 39yo  @ 41.6wks gestation by LMP c/w 14wk U/S who presents to 

[facility] for medical induction of labor secondary to postdates pregnancy and 

advanced maternal age. She has been a [facility] 

the duration of her pregnancy which has remained complicated only by her ad

vanced maternal age. She has received  testing starting at 37wks 

gestation and it has remained reassuring. She is supported by her  Praful 

today.





Dating criteria:


LMP 2022


Initial U/S @ 14wks c/w LMP dating


Serial exams - agree





OB/Gyn History: Term NSVB x 5.  SAB x0.  Last pap 2018-WNL, no hx abnormal.  

Denies history of gonorrhea, chlamydia, genital herpes, oral herpes or any other

 STI.  Sexual partner does NOT have HSV (oral or genital).





Medical Hx: no significant


Surgical Hx:skin craft on hand at age 8


Social Hx: Sexual behavior: Monogamous with male partner. Stopped drinking 

alcohol due to pregnancy. Denies current use of tobacco, marijuana or other 

recreational drugs. Reports that she is safe in current relationship.


Family Hx: Denies family history of congenital anomalies, Cystic Fibrosis or 

chromosomal abnormalities.


 


Allergies: NKDA


Medications: PNV





Prenatal course:


O positive, antibody negative


Rubella immune; varicella non-immune


HIV neg, RPR non-reactive


Hep B Neg, Hep C neg


Genetic screening - declined


Initial U/S @ 14wks gestation c/w LMP dating.


FAS WNL. Placenta is fundal without previa. Size c/w dating (EFW 46%tile). 3VC.


COVID-19 vaccine - declined


Influenza vaccine- declined


Tdap - declined


Glucola - declined


GBS negative





Physical exam:


Normocephalic, atraumatic


Heart RRR w/o M/G/R


Lungs CTAB


Abdomen gravid, soft, nontender


EFW 3600g


FHR baseline 150, moderate variability, + accels, no decels


Contractions palpate mild occasionally with soft resting tone


SVE 5/80/-2, midposition, soft. Vertex.


AROM occurred at 0857 for a moderate amount of light meconium stained amniotic 

fluid


Bilateral LE's no edema


Mood is good.





Assessment:


39yo  @ 41.6wks gestation by LMP c/w 14wk U/S


Advanced maternal age


Postdates pregnancy


GBS negative


FHR Category I





Plan:


Admit to [facility] for medical induction of labor.


Initiate pitocin PRN in 6 hours for augmentation of labor.


Intermittent fetal heart rate auscultation.


Jacuzzi PRN. Nitrous oxide PRN.


Epidural per maternal request.


Anticipate .

## 2023-08-26 NOTE — PROCEDURE REPORT
- HPI


Diagnosis/Indication for NST: Other


                                                               Current Pregnancy





EDU                              23


Gestation                        41 Weeks and 4 Days


                          6


Para                             5





Vital Signs











Temperature  36.7 C   23 08:17


 


Heart Rate  69   23 08:17


 


Respiratory Rate  16   23 08:17


 


Blood Pressure  119/77   23 08:17


 


O2 Saturation  100   23 08:17




















Temperature  36.7 C   23 08:17


 


Heart Rate  69   23 08:17


 


Respiratory Rate  16   23 08:17


 


Blood Pressure  119/77   23 08:17


 


O2 Saturation  100   23 08:17


 


If not protocol: Oxygen Flow, liters/minute      














- NST Procedure


NST Procedure





Start Date                       23


Start Time                       09:06


Stop Time                        09:32


Vibroacoustic Stimulation Used   No


Patient States Fetal Movement    Yes











- Results and Plan


Plan: 





NST reactive. FHR baseline 140s, moderate variability, + accels, no decels


Contractions palpate mild, occasionally with soft resting tone

## 2023-08-26 NOTE — PROVIDER PROGRESS NOTE
Labor Progress Note





- Uterine Monitoring


Uterine Monitoring Mode: positive: External toco


Contraction Frequency (min/apart): 3-4


Contraction Intensity: positive: Strong


Uterine Resting Tone: positive: Soft





- Fetal Monitoring


Fetal Monitor Mode: positive: External ultrasound


Fetal Heart Rate Baseline: 140


Fetal Heart Rate Variability: positive: Moderate (6-25 bmp)


Fetal Accelerations: positive: Absent


Fetal Decelerations: positive: Variable, Recurrent (>50% x20 min)


Fetal Strip Review: positive: Category II





- Labor Progress Note


Labor Progress Note/Additional Text: 





S: Feeling more comfortable now with her epidural. Her mood is good and her 

 is feeling significantly relieved now that she is comfortable with her 

epidural.


O: FHR baseline 150s, moderate variability, no accels, intermittent variable 

decelerations - overall reassuring


contractions palpate strong every 3-4 minutes with soft resting tone


SVE deferred until patient completely comfortable with her epidural.


A: 37yo  @ 41.6wks gestation


Postdates pregnancy


Advanced maternal age


FHR Category II


GBS negative


P: Continuous fetal monitoring.


Restart pitocin following next SVE with titration per protocol.


Maintain epidural for pain management.


Rotate on peanut ball q 30 minutes once comfortable with epidural.


Pediatrician notified of light meconium stained amniotic fluid.


Anticipate .

## 2023-08-26 NOTE — DELIVERY NOTE
Delivery Note





- Labor


Labor: positive: Augmented by oxytocin, Induced by ARM





- Infant Delivery Method


Infant Delivery Method: positive: Spontaneous vaginal delivery





- Birth Presentation


Birth Presentation: positive: Vertex, SANDRA - right occiput anterior





- Nuchal Cord


Nuchal Cord: positive: Present, Reduced





- Amniotic Fluid Description


Amniotic Fluid Description: positive: Light meconium





- Episiotomy Type


Episiotomy Type: positive: None





- Laceration


Laceration: positive: None





- Delivery Outcome


Delivery Outcome: positive: Livebirth





- 


: positive: Placed in direct skin contact with mother, Bulb syringe, 

Stimulated, Warmed, [place] used


[place] sex: positive: Male





- Cord


Cord: positive: 3 vessels





- Placenta


Placenta: positive: Intact, Spontaneous





- Estimated Blood Loss


Estimated Blood Loss (in cc): 200





- Post Delivery Events


Post Delivery Events: positive: No post delivery events





- Delivery Comments (Free Text/Narrative)


Delivery Comments (Free Text/Narrative): 





Labor: This 39yo  @ 41.6wks gestation who presented on 2023 for 

medical induction of labor secondary to postdates pregnancy and advanced 

maternal age. Cervix was 5/80/-2 and vertex with intact membranes. AROM occurred

at 0857 and was noted to be a moderate amount of light meconium stained amniotic

fluid. FHR pattern demonstrated Category II pattern with intermittent periods of

category I but overall remained reassuring. Normal labor course. Epidural placed

per maternal request. Pt progressed to anterior lip with onset of pushing at 

1841 and was c/c/+1 at 1846.


Birth. Normal SVB of viable male infant on 2023 @ 1851. Nuchal cord x 1 

was reduced. The  was placed on maternal abdomen, stimulated, dried, and 

placed skin to skin. Apgar's were 9/9 at 1 and 5 minutes respectively. Pitocin 

administered via IV for hemostasis. The umbilical cord was allowed to stop 

pulsating at which time it was doubly clamped by CNM and cut by FOB. 3VC. Cord 

blood was obtained. Fundal massage and gentle cord traction applied for active 

management of the third stage. Placenta delivered spontaneously and intact at 

1857. 


Fourth stage: Uterine fundus firm and there is no excessive bleeding. The 

perineum, vagina, and cervix were inspected and found to be intact. 

Breastfeeding initiated. Family bonding well. Both mother and baby were left in 

stable condition.

## 2023-08-26 NOTE — PROVIDER PROGRESS NOTE
Labor Progress Note





- Uterine Monitoring


Uterine Monitoring Mode: positive: External toco


Contraction Frequency (min/apart): 7-9


Contraction Intensity: positive: Mild


Uterine Resting Tone: positive: Soft





- Fetal Monitoring


Fetal Monitor Mode: positive: External ultrasound


Fetal Heart Rate Baseline: 140


Fetal Heart Rate Variability: positive: Moderate (6-25 bmp)


Fetal Accelerations: positive: Present, 15x15


Fetal Decelerations: positive: Variable, Intermittent (<50% x20 min)


Fetal Strip Review: positive: Category II





- Vaginal Exam


Dilation (in cm): 5


Effacement (%): 80


Station: -2


Cervical Position: Midposition





- Labor Progress Note


Labor Progress Note/Additional Text: 





S: Was pumping and feeling some contractions when she was seated but states when

she is up moving around or bouncing on the ball she feels that her contraction 

go away. Her  Praful is supportive at the bedside.


O: FHR baseline 140s, moderate variability, + accels, occasional variable 

deceleration with contractions that pt appreciates as strong


Contractions palpate mild every 7-9 minutes with soft resting tone


SVE unchanged from last exam (/-2) and vertex


A: 39yo  @ 41.6wks gestation by LMP c/w 14wk U/S


FHR Category II - overall reassuring


GBS neg


Advanced maternal age


P: Initiate pitocin for augmentation of labor with titration per protocol.


Continuous fetal monitoring.


Jacuzzi PRN. Nitrous oxide PRN.


Epidural per maternal request.


Anticipate .

## 2023-08-26 NOTE — ANESTHESIA
Pre-Anesthesia VS, & Labs





- Diagnosis





labor induction





- Procedure


labor epidural





Vital Signs: 





                                        











Temp Pulse Resp BP Pulse Ox O2 Flow Rate


 


 36.2 C L  69   16   120/77       


 


 23 09:10  23 09:10  23 09:10  23 09:10      











Height: 5 ft 3 in


Weight (kg): 79.379 kg


Body Mass Index: 30.9


BMI Classification: Obese





- NPO


>8 hours





- Pregnancy


Is Patient Pregnant?: Yes





- Lab Results


Current Lab Results: 





Laboratory Tests





23 08:30: Blood Type O POSITIVE, Antibody Screen NEGATIVE, Crossmatch IS 

Only See Detail


23 08:30: WBC 11.2 H, RBC 4.27, Hgb 11.9 L, Hct 36.2 L, MCV 84.8, MCH 

27.9, MCHC 32.9, RDW 13.2, Plt Count 254, MPV 10.0, Neut # (Auto) 8.7 H, Lymph #

(Auto) 1.9, Mono # (Auto) 0.4, Eos # (Auto) 0.1, Baso # (Auto) 0.1, Absolute 

Nucleated RBC 0.00, Nucleated RBC % 0.0








Fish Bones: 


                                 23 08:30








Home Medications and Allergies





Active Medications





Carboprost Tromethamine (Carboprost Tromethamine 250 Mcg/Ml Amp)  250 mcg IM 

Q15M PRN


   PRN Reason: Step 4: Hemorrhage protocol


   Stop: 23 08:24


Diphenhydramine HCl (Diphenhydramine Inj 50 Mg/Ml Vial)  12.5 - 25 mg IVP Q6HR 

PRN


   PRN Reason: ITCHING


Ephedrine Sulfate (Ephedrine 50 Mg/Ml Vial)  5 mg IVP Q5M PRN


   PRN Reason: For SBP<100;give until SBP>100


Oxytocin/Sodium Chloride (Pitocin/Sodium Chloride)  500 mls @ 999 mls/hr IV PRN 

PRN; Protocol


   PRN Reason: POST-PARTUM HEMORR PREVENTION


   Stop: 23 08:24


Tranexamic Acid (Tranexamic 1,000 Mg/100ml-Nacl)  1,000 mg in 100 mls @ 600 

mls/hr IV .ONCE PRN


   PRN Reason: EBL >1200mL and within 3hr


   Stop: 23 08:24


Lactated Ringer's (Lr)  1,000 mls @ 100 mls/hr IV .Q10H [place]


   Last Admin: 23 15:48 Dose:  100 mls/hr


   


Oxytocin/Sodium Chloride (Pitocin/Sodium Chloride)  500 mls @ 1 mls/hr IV TITR 

[place]; Protocol


   Last Admin: 23 15:54 Dose:  2 milliunit/min, 2 mls/hr


   


Ropivacaine (Naropin 0.2%)  200 mg in 100 mls @ 0 mls/hr EP PRN PRN; Protocol


   PRN Reason: PAIN


Lidocaine HCl (Lidocaine 1% 20 Ml Mdv)  20 ml ID .ONCE PRN


   PRN Reason: PERINEAL REPAIR


   Stop: 23 08:24


Methylergonovine Maleate (Methylergonovine 0.2 Mg/Ml Vial)  0.2 mg IM .ONCE PRN


   PRN Reason: Step 2: Hemorrhage protocol


   Stop: 23 08:24


Metoclopramide HCl (Metoclopramide 10 Mg/2 Ml Vial)  10 mg IVP Q6HR PRN


   PRN Reason: Nausea / Vomiting


Misoprostol (Misoprostol 200 Mcg Tablet)  800 mcg BC .ONCE PRN


   PRN Reason: Step 3: Hemorrhage protocol


   Stop: 23 08:24


Nalbuphine HCl (Nalbuphine 10 Mg/Ml Amp)  2.5 - 5 mg IVP Q4H PRN


   PRN Reason: ITCHING


Naloxone HCl (Naloxone 0.4 Mg/Ml Vial)  0.1 mg IVP Q2M PRN


   PRN Reason: RR<8


Ondansetron HCl (Ondansetron 4 Mg/2 Ml Vial)  4 mg IVP Q4HR PRN


   PRN Reason: Nausea / Vomiting


Ondansetron HCl (Ondansetron 4 Mg/2 Ml Vial)  4 mg IVP Q6HR PRN


   PRN Reason: Nausea / Vomiting


Oxytocin (Oxytocin 10 Unit/Ml Vial)  10 unit IM .ONCE PRN


   PRN Reason: Step one: If no IV access


   Stop: 23 08:24


Sodium Chloride (Sodium Chloride Flush 0.9% 10 Ml Syringe)  10 ml IVP 

0100,0900,1700 [facility]


   Last Admin: 23 15:48 Dose:  Not Given


   


Sodium Chloride (Sodium Chloride Flush 0.9% 10 Ml Syringe)  10 ml IVP PRN PRN


   PRN Reason: AS NEEDED PER PROVIDER ORDERS








Allergies/Adverse Reactions: 


                                    Allergies











Allergy/AdvReac Type Severity Reaction Status Date / Time


 


No Known Drug Allergies Allergy   Verified 10/19/21 11:25














Anes History & Medical History





- Anesthetic History


Anesthesia Complications: reports: No previous complications





- Medical History


Cardiovascular: reports: None


Pulmonary: reports: None


Smoking Status: Never smoker





- Obstetrical History


: 6


Parity: 5


Prenatal Events: reports: Other


Pregnancy Complications: reports: Other





Exam


General: Alert, Oriented x3


Dental: WNL


Neck Mobility: Normal


Mallampati classification: II


Thyromental Distance: greater than 6 cm


Respiratory: Lungs clear


Cardiovascular: Regular rate





Plan


Anesthesia Type: Epidural


Consent for Procedure(s) Verified and Reviewed: Yes


Code Status: Attempt Resuscitation


ASA classification: 2-Mild systemic disease


Is this case an emergency?: No

## 2023-08-27 VITALS — SYSTOLIC BLOOD PRESSURE: 117 MMHG | OXYGEN SATURATION: 99 % | DIASTOLIC BLOOD PRESSURE: 73 MMHG

## 2023-08-27 RX ADMIN — IBUPROFEN SCH MG: 800 TABLET, FILM COATED ORAL at 15:14

## 2023-08-27 RX ADMIN — IBUPROFEN SCH MG: 800 TABLET, FILM COATED ORAL at 09:52

## 2023-08-27 RX ADMIN — IBUPROFEN SCH MG: 800 TABLET, FILM COATED ORAL at 03:28

## 2023-08-27 NOTE — DISCHARGE PLAN
Discharge Plan


Problem Reviewed?: Yes


Disposition: 01 Home, Self Care


Condition: Good


Diet: Regular


Activity Restrictions: No Restrictions


Shower Restrictions: No


Driving Restrictions: No


Weight Bearing: Full Weight


Instruction Topics:  Birth Vaginal After


No Smoking: If you smoke, Please STOP!  Call [phone] for help.


Follow-up with: 


Anali De Los Santos CNM, ARNP [Primary Care Provider] -

## 2023-08-27 NOTE — DISCHARGE SUMMARY
Discharge Summary


Condition at Discharge: Good


Discharge Disposition: 01 Home, Self Care





- HOSPITAL COURSE


Hospital Course: 





Date of Admission: 2023





Date of Discharge: 2023





Diagnosis on Admission:


1. 39yo  @ 41.6wks gestation by LMP c/w 14wk U/S


2. Advanced maternal age


3. Postdates pregnancy


4. FHR Category I


5. GBS negative





Diagnosis on Discharge:


1. 39yo  PPD# 1 s/p TSVB viable male infant


2. Breastfeeding


3. Normal recovery





Brief history:


She is a patient [facility] who presents to [facility] on 2023

@ 41.6wks gestation for medical induction of labor secondary to postdates 

pregnancy and advanced maternal age. She was AROMed for a moderate amount of 

light meconium stained amniotic fluid at 0857. Pitocin was initiated for 

augmentation of labor for a maximum infusion rate of 4mU/mL. Epidural was placed

per maternal request. She progressed to spontaneously deliver a viable male 

infant on 2023 at 1851 over intact perineum. Apgars were 9/9 at 1 and 5 

minutes respectively. EBL 200mL. 


She has been doing well in her postpartum course. She is ambulating and 

tolerating a regular diet. She is urinating without difficulty and her lochia is

normal. Her pain is well controlled with oral medications. She is breastfeeding 

without difficulty and she is bonding well with her baby. She will be discharged

home today on postpartum day #1 with instructions to continue taking her 

prenatal vitamin while breastfeeding and to continue taking ibuprofen and 

tylenol over the counter as needed for pain management. 








Physical exam:


Normocephalic, atraumatic. Heart RRR w/o M/G/R, lungs CTAB, abdomen soft and 

nontender with fundus firm at U, perineum intact, light lochia rubra. Bilateral 

LE's no edema. Mood is good.





- ALLERGIES


Allergies/Adverse Reactions: 


                                    Allergies











Allergy/AdvReac Type Severity Reaction Status Date / Time


 


No Known Drug Allergies Allergy   Verified 10/19/21 11:25














- MEDICATIONS


Home Medications: 


                                Ambulatory Orders











 Medication  Instructions  Recorded  Confirmed


 


HYDROcod/ACETAM 5/325 [Norco 5/325] 1 tab PO Q6H PRN #10 tablet 10/15/21 


 


Ibuprofen [Motrin] 800 mg PO Q8H PRN #30 tablet 10/15/21 














- LABS


Result Diagrams: 


                                 23 08:30

## 2023-08-27 NOTE — LABOR FLOWSHEET
===================================

Labor Flowsheet

===================================

Datetime Report Generated by CPN: 08/27/2023 15:43

   

   

===========================

Datetime: 08/27/2023 15:13

===========================

   

   

===================================

VITAL SIGNS

===================================

   

 NBP Sys/Loli/Mean (mmHg):  117

:  73

:  83

 Pulse:  76

 LaborFlag:  Labor

   

===========================

Datetime: 08/26/2023 22:05

===========================

   

 SpO2 (%):  99

   

===========================

Datetime: 08/26/2023 20:05

===========================

   

 Respirations:  16

   

===========================

Datetime: 08/26/2023 19:45

===========================

   

 Temperature (C):  36.8

 Temperature Route:  Oral

   

===========================

Datetime: 08/26/2023 18:51

===========================

   

   

===================================

UTERINE ACTIVITY

===================================

   

 Monitor Mode:  External

 Frequency (min):  2-4

 Quality:  Strong

 Duration (sec):  60-90

 Pattern:  Normal: <= 5 Contractions in 10 Minutes

 Resting Tone (Palpate):  Relaxed

   

===================================

FETAL ASSESSMENT A

===================================

   

 Monitor Mode:  External US

 FHR Baseline Rate :  135

 FHR Baseline Changes:  No Baseline Change

 Variability:  Moderate 6-25 bpm

 Accelerations:  None

 Decelerations:  Early; Late; Variable

 Category:  Category II

   

===========================

Datetime: 08/26/2023 18:49

===========================

   

 I/O Interventions:  [place] Discontinued

 Patient Care Comments:  700ml

   

===========================

Datetime: 08/26/2023 18:46

===========================

   

   

===================================

VAGINAL EXAM

===================================

   

 Dilatation (cm):  10.0

   

===========================

Datetime: 08/26/2023 18:41

===========================

   

   

===================================

STAGE 2

===================================

   

 Pushing:  Coached on Pushing

 Pushing Position:  Pushing with Contractions

   

===========================

Datetime: 08/26/2023 18:40

===========================

   

 Vaginal Exam Comments:  ant lip

   

===========================

Datetime: 08/26/2023 18:18

===========================

   

 Effacement (%):  100

 Station:  0

 Exam by:  Anali Filiberto, CNM

   

===========================

Datetime: 08/26/2023 17:32

===========================

   

 Epidural Procedure Other:  Pump Started

   

===========================

Datetime: 08/26/2023 17:21

===========================

   

 Epidural Procedure:  Completed

   

===========================

Datetime: 08/26/2023 17:12

===========================

   

 Anesthesia Comments:  started

   

===========================

Datetime: 08/26/2023 17:08

===========================

   

   

===================================

ANESTHESIA

===================================

   

 Epidural Positioning:  Sitting

   

===========================

Datetime: 08/26/2023 17:00

===========================

   

 Actions for Fetal Decelerations:  Pitocin Off

   

===========================

Datetime: 08/26/2023 16:49

===========================

   

   

===================================

MEDICATIONS

===================================

   

 Pitocin (milliunits):  Discontinued

   

===========================

Datetime: 08/26/2023 16:21

===========================

   

 Patient Position/Activity:  Left Lateral

   

===========================

Datetime: 08/26/2023 16:12

===========================

   

   

===================================

PATIENT CARE

===================================

   

 IV/Blood Work:  IV Bolus Given ml @ 500

   

===========================

Datetime: 08/26/2023 16:00

===========================

   

 Pitocin Checklist:  No More than 1 Late Deceleration Occurred in Past 30 Minutes; No More than 2 Ta
iable Decelerations > 60 Seconds in Duration and decreasing >60 bpm in 30 minutes; No More than 5 [place]
rine Contractions in 10 Minutes for any 20 Minute Interval; Uterus Palpates Soft between Contractions


   

===========================

Datetime: 08/26/2023 14:30

===========================

   

   

===================================

COMMUNICATION

===================================

   

 Provider Notified (Name):  A. Filiberto CNM

 Notification Reason:  Fetal Status

 Communication Comments:  Provider notified of variable to 90's.  Planning to come in soon to see pt.


   

===========================

Datetime: 08/26/2023 14:12

===========================

   

 Membranes Ruptured Date/Time:  08/26/2023 08:57

   

===========================

Datetime: 08/26/2023 11:53

===========================

   

 Monitor Interventions for FHR:  Ultrasound Adjusted

   

===========================

Datetime: 08/26/2023 11:47

===========================

   

 Comments:  int. tracing d/t maternal movement; tracing MHR @ times

   

===========================

Datetime: 08/26/2023 09:00

===========================

   

 Contraction Comments:  unable to trace on TOCO

   

===========================

Datetime: 08/26/2023 08:57

===========================

   

 Membrane Status:  Ruptured

 Membranes Rupture Method:  Artificial

 Amniotic Fluid Color:  Light Meconium

 Amniotic Fluid Amount:  Moderate

 Amniotic Fluid Odor:  Normal

 Vaginal Bleeding:  None

 Cervix, Consistency:  Soft

 Cervix, Position:  Midposition

   

===========================

Datetime: 08/26/2023 08:20

===========================

   

Stage of Pregnancy:  Labor